# Patient Record
Sex: MALE | Race: WHITE | NOT HISPANIC OR LATINO | URBAN - METROPOLITAN AREA
[De-identification: names, ages, dates, MRNs, and addresses within clinical notes are randomized per-mention and may not be internally consistent; named-entity substitution may affect disease eponyms.]

---

## 2017-01-21 ENCOUNTER — ALLSCRIPTS OFFICE VISIT (OUTPATIENT)
Dept: OTHER | Facility: OTHER | Age: 32
End: 2017-01-21

## 2017-10-30 ENCOUNTER — ALLSCRIPTS OFFICE VISIT (OUTPATIENT)
Dept: OTHER | Facility: OTHER | Age: 32
End: 2017-10-30

## 2018-01-12 VITALS
HEIGHT: 68 IN | DIASTOLIC BLOOD PRESSURE: 78 MMHG | BODY MASS INDEX: 32.6 KG/M2 | SYSTOLIC BLOOD PRESSURE: 132 MMHG | HEART RATE: 96 BPM | TEMPERATURE: 98 F | RESPIRATION RATE: 18 BRPM | WEIGHT: 215.13 LBS

## 2018-01-13 NOTE — PROGRESS NOTES
Chief Complaint  Patient presents for Tdap vaccine  Administered into left deltoid without incident  nil/lpn      Active Problems    1  Cough (786 2) (R05)   2  Need for Tdap vaccination (V06 1) (Z23)   3  Not vaccinated against influenza (V64 00) (Z28 9)    Current Meds   1  Azithromycin 250 MG Oral Tablet; TAKE 2 TABLETS ON DAY 1 THEN TAKE 1 TABLET A   DAY FOR 4 DAYS; Therapy: [id number] to (Last LM:[id number])  Requested for: 21Jan2017 Ordered   2  Promethazine-Codeine 6 25-10 MG/5ML Oral Syrup; TAKE 5 ML EVERY 6 HOURS AS   NEEDED; Therapy: 21Jan2017 to (Evaluate:30Jan2017); Last Rx:21Jan2017 Ordered    Allergies    1   No Known Drug Allergies    Plan  Need for Tdap vaccination    · Adacel 5-2-15 5 LF-MCG/0 5 Intramuscular Suspension    Signatures   Electronically signed by : JANKI Morfin ; Nov 2 2017  7:42AM EST                       (Author)

## 2018-01-16 NOTE — PROGRESS NOTES
Assessment    1  Ear pain (388 70) (H92 09)   2  Rhinitis (472 0) (J31 0)   3  Conjunctivitis (372 30) (H10 9)    Plan  Conjunctivitis    · Sulfacetamide Sodium 10 % Ophthalmic Solution; INSTILL 1 DROP  IN THE  AFFECTED EYE(S) EVERY  3 HOURS, WHILE AWAKE  Rhinitis    · Fluticasone Propionate 50 MCG/ACT Nasal Suspension; USE 1 SPRAY IN EACH  NOSTRIL ONCE DAILY    Discussion/Summary    #1 rhinitis  I believe the ear pain is from the nasal congestion - Rx sent for flonase, he used this before, but not used this recently  been doing the Neti pot, but could not get it in the Lt nostril, as it was so congested  #2 conjunctivitis  Above meds  Can be contagious, advised universal precautions  to the er, if developed visual change, which he denies at this time  The patient was counseled regarding instructions for management  Chief Complaint  Patient presents with c/o discharge from both eyes, sinus congestion and left ear pain  Family exposure to upper respiratory illnesses /cg      History of Present Illness  HPI: 29y/o wm presents to office for ear ache and red eyes  Lt ear pressure, been there for a week or so - also has nasal congestion, and states he was seen in the past for the ear pain, which at that time told that it was from his nasal congestion  Hx multiple nasal polyps  Eye started yesterday, c/ redness - had clear/yellowish d/c yesterday - no change in vision  this am, both eyes were matted shut  today the redness, pain in the eyes is not as bad as yesterday  Review of Systems    Constitutional: no fever and no chills  Respiratory: no cough  Gastrointestinal: no abdominal pain  Active Problems    1  Acute laryngitis (464 00) (J04 0)    Past Medical History    1  History of Denial Of Any Significant Medical History    Social History    · Never A Smoker    Current Meds   1  No Reported Medications Recorded    Allergies    1   No Known Drug Allergies    Vitals   Recorded: [id number] 08:13AM Temperature 97 F, Tympanic   Heart Rate 64, L Radial   Pulse Quality Normal, L Radial   Respiration 18   Respiration Quality Normal   Systolic 343, LUE, Sitting   Diastolic 78, LUE, Sitting   Height 5 ft 7 5 in   Weight 209 lb    BMI Calculated 32 25   BSA Calculated 2 07     Physical Exam    Constitutional   General appearance: No acute distress, well appearing and well nourished  Ears, Nose, Mouth, and Throat   Otoscopic examination: Abnormal   The right tympanic membrane was not red, was not bulging and was not retracted  The left tympanic membrane was not red, was not bulging and was not retracted  The right external canal was erythematous, but was not tender and was not swollen  The left external canal was erythematous, but was not tender and was not swollen  Nasal mucosa, septum, and turbinates: Abnormal   There was clear rhinorrhea and a mucoid discharge, but no purulent discharge and no bleeding from both nares  The bilateral nasal mucosa was edematous, but not bleeding, not crusted, not excoriated, not pale/blue and not red  Oropharynx: Normal with no erythema, edema, exudate or lesions  Pulmonary   Respiratory effort: No increased work of breathing or signs of respiratory distress  Respiratory rate: normal  Assessment of respiratory effort revealed normal rhythm and effort, no accessory muscle use, no air hunger and no distress  Psychiatric   Judgment and insight: Normal     Orientation to person, place and time: Normal     Recent and remote memory: Intact      Mood and affect: Normal        Results/Data  PHQ-2 Adult Depression Screening [id number] 08:16AM User, s     Test Name Result Flag Reference   PHQ-2 Adult Depression Score 0     Q1: 0, Q2: 0   PHQ-2 Adult Depression Screening Negative         Signatures   Electronically signed by : JANKI Travis ; Feb 4 2016  9:02AM EST                       (Author)